# Patient Record
Sex: MALE | Race: OTHER | NOT HISPANIC OR LATINO | URBAN - METROPOLITAN AREA
[De-identification: names, ages, dates, MRNs, and addresses within clinical notes are randomized per-mention and may not be internally consistent; named-entity substitution may affect disease eponyms.]

---

## 2023-05-28 ENCOUNTER — EMERGENCY (EMERGENCY)
Facility: HOSPITAL | Age: 23
LOS: 0 days | Discharge: HOME | End: 2023-05-29
Attending: EMERGENCY MEDICINE
Payer: COMMERCIAL

## 2023-05-28 VITALS
TEMPERATURE: 97 F | DIASTOLIC BLOOD PRESSURE: 87 MMHG | SYSTOLIC BLOOD PRESSURE: 138 MMHG | HEART RATE: 86 BPM | RESPIRATION RATE: 18 BRPM | OXYGEN SATURATION: 96 % | HEIGHT: 69 IN | WEIGHT: 214.07 LBS

## 2023-05-28 DIAGNOSIS — F12.90 CANNABIS USE, UNSPECIFIED, UNCOMPLICATED: ICD-10-CM

## 2023-05-28 DIAGNOSIS — F41.9 ANXIETY DISORDER, UNSPECIFIED: ICD-10-CM

## 2023-05-28 DIAGNOSIS — R61 GENERALIZED HYPERHIDROSIS: ICD-10-CM

## 2023-05-28 DIAGNOSIS — F43.9 REACTION TO SEVERE STRESS, UNSPECIFIED: ICD-10-CM

## 2023-05-28 DIAGNOSIS — R10.9 UNSPECIFIED ABDOMINAL PAIN: ICD-10-CM

## 2023-05-28 DIAGNOSIS — N32.89 OTHER SPECIFIED DISORDERS OF BLADDER: ICD-10-CM

## 2023-05-28 DIAGNOSIS — E86.0 DEHYDRATION: ICD-10-CM

## 2023-05-28 DIAGNOSIS — R11.0 NAUSEA: ICD-10-CM

## 2023-05-28 DIAGNOSIS — R53.1 WEAKNESS: ICD-10-CM

## 2023-05-28 DIAGNOSIS — R29.700 NIHSS SCORE 0: ICD-10-CM

## 2023-05-28 DIAGNOSIS — Z20.822 CONTACT WITH AND (SUSPECTED) EXPOSURE TO COVID-19: ICD-10-CM

## 2023-05-28 LAB
ALBUMIN SERPL ELPH-MCNC: 4.4 G/DL — SIGNIFICANT CHANGE UP (ref 3.3–5)
ALP SERPL-CCNC: 58 U/L — SIGNIFICANT CHANGE UP (ref 40–120)
ALT FLD-CCNC: 37 U/L — SIGNIFICANT CHANGE UP (ref 12–78)
ANION GAP SERPL CALC-SCNC: 9 MMOL/L — SIGNIFICANT CHANGE UP (ref 5–17)
APTT BLD: 26.4 SEC — LOW (ref 27.5–35.5)
AST SERPL-CCNC: 29 U/L — SIGNIFICANT CHANGE UP (ref 15–37)
BASOPHILS # BLD AUTO: 0.02 K/UL — SIGNIFICANT CHANGE UP (ref 0–0.2)
BASOPHILS NFR BLD AUTO: 0.2 % — SIGNIFICANT CHANGE UP (ref 0–2)
BILIRUB SERPL-MCNC: 0.9 MG/DL — SIGNIFICANT CHANGE UP (ref 0.2–1.2)
BUN SERPL-MCNC: 17 MG/DL — SIGNIFICANT CHANGE UP (ref 7–23)
CALCIUM SERPL-MCNC: 9.2 MG/DL — SIGNIFICANT CHANGE UP (ref 8.5–10.1)
CHLORIDE SERPL-SCNC: 104 MMOL/L — SIGNIFICANT CHANGE UP (ref 96–108)
CK SERPL-CCNC: 620 U/L — HIGH (ref 26–308)
CK SERPL-CCNC: 692 U/L — HIGH (ref 26–308)
CO2 SERPL-SCNC: 25 MMOL/L — SIGNIFICANT CHANGE UP (ref 22–31)
CREAT SERPL-MCNC: 1.04 MG/DL — SIGNIFICANT CHANGE UP (ref 0.5–1.3)
EGFR: 105 ML/MIN/1.73M2 — SIGNIFICANT CHANGE UP
EOSINOPHIL # BLD AUTO: 0.08 K/UL — SIGNIFICANT CHANGE UP (ref 0–0.5)
EOSINOPHIL NFR BLD AUTO: 1 % — SIGNIFICANT CHANGE UP (ref 0–6)
GLUCOSE SERPL-MCNC: 99 MG/DL — SIGNIFICANT CHANGE UP (ref 70–99)
HCT VFR BLD CALC: 46.6 % — SIGNIFICANT CHANGE UP (ref 39–50)
HGB BLD-MCNC: 16 G/DL — SIGNIFICANT CHANGE UP (ref 13–17)
IMM GRANULOCYTES NFR BLD AUTO: 0.5 % — SIGNIFICANT CHANGE UP (ref 0–0.9)
INR BLD: 1.03 RATIO — SIGNIFICANT CHANGE UP (ref 0.88–1.16)
LIDOCAIN IGE QN: 70 U/L — LOW (ref 73–393)
LYMPHOCYTES # BLD AUTO: 0.42 K/UL — LOW (ref 1–3.3)
LYMPHOCYTES # BLD AUTO: 5 % — LOW (ref 13–44)
MANUAL SMEAR VERIFICATION: SIGNIFICANT CHANGE UP
MCHC RBC-ENTMCNC: 28.7 PG — SIGNIFICANT CHANGE UP (ref 27–34)
MCHC RBC-ENTMCNC: 34.3 GM/DL — SIGNIFICANT CHANGE UP (ref 32–36)
MCV RBC AUTO: 83.7 FL — SIGNIFICANT CHANGE UP (ref 80–100)
MONOCYTES # BLD AUTO: 0.36 K/UL — SIGNIFICANT CHANGE UP (ref 0–0.9)
MONOCYTES NFR BLD AUTO: 4.3 % — SIGNIFICANT CHANGE UP (ref 2–14)
NEUTROPHILS # BLD AUTO: 7.5 K/UL — HIGH (ref 1.8–7.4)
NEUTROPHILS NFR BLD AUTO: 89 % — HIGH (ref 43–77)
PLAT MORPH BLD: NORMAL — SIGNIFICANT CHANGE UP
PLATELET # BLD AUTO: 288 K/UL — SIGNIFICANT CHANGE UP (ref 150–400)
PLATELET COUNT - ESTIMATE: NORMAL — SIGNIFICANT CHANGE UP
POTASSIUM SERPL-MCNC: 4 MMOL/L — SIGNIFICANT CHANGE UP (ref 3.5–5.3)
POTASSIUM SERPL-SCNC: 4 MMOL/L — SIGNIFICANT CHANGE UP (ref 3.5–5.3)
PROT SERPL-MCNC: 8.2 GM/DL — SIGNIFICANT CHANGE UP (ref 6–8.3)
PROTHROM AB SERPL-ACNC: 12 SEC — SIGNIFICANT CHANGE UP (ref 10.5–13.4)
RBC # BLD: 5.57 M/UL — SIGNIFICANT CHANGE UP (ref 4.2–5.8)
RBC # FLD: 13.1 % — SIGNIFICANT CHANGE UP (ref 10.3–14.5)
RBC BLD AUTO: NORMAL — SIGNIFICANT CHANGE UP
SARS-COV-2 RNA SPEC QL NAA+PROBE: SIGNIFICANT CHANGE UP
SODIUM SERPL-SCNC: 138 MMOL/L — SIGNIFICANT CHANGE UP (ref 135–145)
TROPONIN I, HIGH SENSITIVITY RESULT: 24.52 NG/L — SIGNIFICANT CHANGE UP
WBC # BLD: 8.42 K/UL — SIGNIFICANT CHANGE UP (ref 3.8–10.5)
WBC # FLD AUTO: 8.42 K/UL — SIGNIFICANT CHANGE UP (ref 3.8–10.5)

## 2023-05-28 PROCEDURE — 85025 COMPLETE CBC W/AUTO DIFF WBC: CPT

## 2023-05-28 PROCEDURE — 74177 CT ABD & PELVIS W/CONTRAST: CPT | Mod: MD

## 2023-05-28 PROCEDURE — 84484 ASSAY OF TROPONIN QUANT: CPT

## 2023-05-28 PROCEDURE — 74177 CT ABD & PELVIS W/CONTRAST: CPT | Mod: 26,MD

## 2023-05-28 PROCEDURE — 70450 CT HEAD/BRAIN W/O DYE: CPT | Mod: 26,MD

## 2023-05-28 PROCEDURE — 83690 ASSAY OF LIPASE: CPT

## 2023-05-28 PROCEDURE — 80053 COMPREHEN METABOLIC PANEL: CPT

## 2023-05-28 PROCEDURE — 85610 PROTHROMBIN TIME: CPT

## 2023-05-28 PROCEDURE — 70450 CT HEAD/BRAIN W/O DYE: CPT | Mod: MD

## 2023-05-28 PROCEDURE — 93010 ELECTROCARDIOGRAM REPORT: CPT

## 2023-05-28 PROCEDURE — 82550 ASSAY OF CK (CPK): CPT

## 2023-05-28 PROCEDURE — 85730 THROMBOPLASTIN TIME PARTIAL: CPT

## 2023-05-28 PROCEDURE — 99285 EMERGENCY DEPT VISIT HI MDM: CPT | Mod: 25

## 2023-05-28 PROCEDURE — 87635 SARS-COV-2 COVID-19 AMP PRB: CPT

## 2023-05-28 PROCEDURE — 99284 EMERGENCY DEPT VISIT MOD MDM: CPT

## 2023-05-28 PROCEDURE — 93005 ELECTROCARDIOGRAM TRACING: CPT

## 2023-05-28 PROCEDURE — 36415 COLL VENOUS BLD VENIPUNCTURE: CPT

## 2023-05-28 RX ORDER — ONDANSETRON 8 MG/1
4 TABLET, FILM COATED ORAL ONCE
Refills: 0 | Status: COMPLETED | OUTPATIENT
Start: 2023-05-28 | End: 2023-05-28

## 2023-05-28 RX ORDER — SODIUM CHLORIDE 9 MG/ML
1000 INJECTION INTRAMUSCULAR; INTRAVENOUS; SUBCUTANEOUS ONCE
Refills: 0 | Status: COMPLETED | OUTPATIENT
Start: 2023-05-28 | End: 2023-05-28

## 2023-05-28 RX ADMIN — SODIUM CHLORIDE 1000 MILLILITER(S): 9 INJECTION INTRAMUSCULAR; INTRAVENOUS; SUBCUTANEOUS at 20:57

## 2023-05-28 RX ADMIN — SODIUM CHLORIDE 1000 MILLILITER(S): 9 INJECTION INTRAMUSCULAR; INTRAVENOUS; SUBCUTANEOUS at 23:22

## 2023-05-28 NOTE — ED PROVIDER NOTE - CARE PROVIDER_API CALL
Luan Alves  Pediatrics  [address] [address] [place] [id number]  Phone: [phone]  Fax: [phone]  Follow Up Time:    Luan Alves  Pediatrics  [address] [address] [place] [address]  Phone: [phone]  Fax: [phone]  Follow Up Time:     Clarence Morales  Gastroenterology  [address] [place] [address], [facility] [place] [address]  Phone: [phone]  Fax: [phone]  Follow Up Time:

## 2023-05-28 NOTE — ED PROVIDER NOTE - OBJECTIVE STATEMENT
23 y/o M w/ no pertinent PMHx BIBEMS c/o anxiety attack. pt recently traveled home from a cruise going to the [facility]. denies EtOH use today but did smoke marijuana. states that he was at a friend's house today and was tired, but could not sleep. he also endorsed abdominal pain at the time. when he was driving back home, he experienced weakness in b/l arms. pt was then diaphoretic and states that his limbs locked up with associating severe pain. pt was able to call EMS. currently c/o of nausea, abdominal pain, and dehydration. states that he is under a lot of stress. denies prior anxiety/panic attacks. mom at bedside adds that he recently burned the bottom of feet while playing basketball. no other recent trauma. denies CP. 23 y/o M w/ no pertinent PMHx BIBEMS c/o anxiety attack. pt recently traveled home from a cruise going to the [facility]. denies EtOH use today but did smoke marijuana. states that he was at a friend's house today and was tired, but could not sleep. he also endorsed abdominal pain at the time. when he was driving back home, he experienced generalized weakness. pt was then diaphoretic and states that his limbs locked up with associating severe pain. pt was able to call EMS. currently c/o of nausea, abdominal pain, and dehydration. states that he is under a lot of emotional stress. denies prior anxiety/panic attacks. mom at bedside adds that he recently burned the bottom of feet while playing basketball. No pain at soles of feet, no ankle pain, no joint pain in the ER, no neck pain or stiffness, no skin rash, no recent tick bites, no melena or hematochezia.  no other recent trauma. denies CP, sob or palpitation in ER.

## 2023-05-28 NOTE — ED ADULT NURSE NOTE - NSFALLUNIVINTERV_ED_ALL_ED
Bed/Stretcher in lowest position, wheels locked, appropriate side rails in place/Call bell, personal items and telephone in reach/Instruct patient to call for assistance before getting out of bed/chair/stretcher/Non-slip footwear applied when patient is off stretcher/[place] to call system/Physically safe environment - no spills, clutter or unnecessary equipment/Purposeful proactive rounding/Room/bathroom lighting operational, light cord in reach

## 2023-05-28 NOTE — ED PROVIDER NOTE - CARE PLAN
Principal Discharge DX:	Abdominal pain   1 Principal Discharge DX:	Abdominal pain  Goal:	dehydration, incidental findings on the CAT scan

## 2023-05-28 NOTE — ED ADULT TRIAGE NOTE - CHIEF COMPLAINT QUOTE
pt BIBA complaining of anxiety attack.  pt just recently traveled home from [place].  pt denies alcohol today but did smoke weed.  pt denies SI/HI.  unknown cause of anxiety

## 2023-05-28 NOTE — ED PROVIDER NOTE - NSFOLLOWUPINSTRUCTIONS_ED_ALL_ED_FT
Please note that I have provided you with the results of your labs and imaging. I have discussed with you all the results including all incidental findings, and that you need to see your primary care physician and a gastroenterologists as soon as possible. Please note that even though the CAT scan imaging is an ideal emergent testing that it does not always see all pathology and can not rule out subtle ulcers, masses, tumors or even small perforations at times, so if your symptoms worsen, if you can not eat or drink or if you have any fever, chills or vomiting to return to us immediately. Please avoid marijuana or stress, spicy food or other spices. Hydrate well. Please avoid gym or other exercises. Please note that your CPK (Creatinine Kinase) blood work was slightly high and we repeated it after hydration and it went down so continue to hydrate, but if you have worsening muscle pain, or if you can not eat or drink, or if any other health concerns, return to us immediately. You MUST SEE YOUR DOCTOR BY TUESDAY OR IF YOU CAN NOT TO RETURN TO US IMMEDIATELY.    For all other health concerns reutrn to us immediately.     _______________    Abdominal Pain, Adult  Pain in the abdomen (abdominal pain) can be caused by many things. Often, abdominal pain is not serious and it gets better with no treatment or by being treated at home. However, sometimes abdominal pain is serious.    Your health care provider will ask questions about your medical history and do a physical exam to try to determine the cause of your abdominal pain.    Follow these instructions at home:  Medicines    Take over-the-counter and prescription medicines only as told by your health care provider.  Do not take a laxative unless told by your health care provider.  General instructions      Watch your condition for any changes.  Drink enough fluid to keep your urine pale yellow.  Keep all follow-up visits as told by your health care provider. This is important.  Contact a health care provider if:  Your abdominal pain changes or gets worse.  You are not hungry or you lose weight without trying.  You are constipated or have diarrhea for more than 2–3 days.  You have pain when you urinate or have a bowel movement.  Your abdominal pain wakes you up at night.  Your pain gets worse with meals, after eating, or with certain foods.  You are vomiting and cannot keep anything down.  You have a fever.  You have blood in your urine.  Get help right away if:  Your pain does not go away as soon as your health care provider told you to expect.  You cannot stop vomiting.  Your pain is only in areas of the abdomen, such as the right side or the left lower portion of the abdomen. Pain on the right side could be caused by appendicitis.  You have bloody or black stools, or stools that look like tar.  You have severe pain, cramping, or bloating in your abdomen.  You have signs of dehydration, such as:  Dark urine, very little urine, or no urine.  Cracked lips.  Dry mouth.  Sunken eyes.  Sleepiness.  Weakness.  You have trouble breathing or chest pain.  Summary  Often, abdominal pain is not serious and it gets better with no treatment or by being treated at home. However, sometimes abdominal pain is serious.  Watch your condition for any changes.  Take over-the-counter and prescription medicines only as told by your health care provider.  Contact a health care provider if your abdominal pain changes or gets worse.  Get help right away if you have severe pain, cramping, or bloating in your abdomen.  This information is not intended to replace advice given to you by your health care provider. Make sure you discuss any questions you have with your health care provider.

## 2023-05-28 NOTE — ED PROVIDER NOTE - NS_ ATTENDINGSCRIBEDETAILS _ED_A_ED_FT
I Sher Jensen MD saw and examined the patient. Scribe documented for me and under my supervision. I have modified the scribe's documentation where necessary to reflect my history, physical exam and other relevant documentations pertinent to the care of the patient.

## 2023-05-28 NOTE — ED PROVIDER NOTE - DIFFERENTIAL DIAGNOSIS
Differential Diagnosis Patient with abdominal pain, anxiety, labs, imaging performed. CT shows no acute emergent findings, incidental findings discussed with patient. Multiple hour observation in the ER is unremarkable. Tolerating PO, CNs 2-12 intact. NIH=0, pain resolved, all results including slightly elevated but improving CPK provided for patient. Patient to follow up with GI and primary, strict return precautions provided. Pt and mom agreeable with plan.

## 2023-05-28 NOTE — ED PROVIDER NOTE - PATIENT PORTAL LINK FT
You can access the FollowMyHealth Patient Portal offered by [facility] by registering at the following website: [email or web address]. By joining [email or web address]’s FollowMyHealth portal, you will also be able to view your health information using other applications (apps) compatible with our system.

## 2023-05-28 NOTE — ED PROVIDER NOTE - ENMT, MLM
Airway patent, Nasal mucosa clear. Mouth with normal mucosa. Throat has no vesicles, no oropharyngeal exudates and uvula is midline. Airway patent, Nasal mucosa clear. Mouth with normal mucosa. Throat has no vesicles, no oropharyngeal exudates and uvula is midline. No epsitaxis.

## 2023-05-28 NOTE — ED PROVIDER NOTE - ENMT NEGATIVE STATEMENT, MLM
none Ears: no ear pain and no hearing problems. Nose: no nasal congestion and no nasal drainage. Mouth/Throat: no dysphagia, no hoarseness and no throat pain. Neck: no lumps, no pain, no stiffness and no swollen glands.

## 2023-05-28 NOTE — ED ADULT NURSE NOTE - OBJECTIVE STATEMENT
Pt. is a 22YOM pt BIBA complaining of abdominal pain and anxiety. Pt. endorsing nausea. pt just recently traveled home from [place] country.  pt denies alcohol today but endorses smoking marijuana.  pt denies SI/HI.

## 2023-05-28 NOTE — ED PROVIDER NOTE - PROGRESS NOTE DETAILS
Camila COLEMAN: Patient nontoxic, VSS, tolerating PO, ambulatory, CTs negative for any acute pathology. Provided patient with the results of the CT. Patient to see primary and GI. Trace pelvic fluid but no evidence of perforation as per radiology. Patient provided with results and the need for follow up. Camila COLEMAN: Patient nontoxic, VSS, tolerating PO, ambulatory, CTs negative for any acute pathology. Provided patient with the results of the CT. Patient to see primary and GI. Trace pelvic fluid but no evidence of perforation as per radiology. Patient provided with results and the need for follow up. Patient and mom wish to go home, aware that CAT scan imaging while ideal for emergency is not an ideal imaging, and are happy with care provided. Strict return precautions provided if any fever, chills, repeat or return of the abdominal pain provided.

## 2023-05-28 NOTE — ED PROVIDER NOTE - CLINICAL SUMMARY MEDICAL DECISION MAKING FREE TEXT BOX
pt with abd pain, nausea, lightheadedness. plan: labs, CT of abdomen, hydration, and reassess. pt with abd pain, nausea, lightheadedness. plan: labs, CT of abdomen, hydration, and reassess.    Patient with abdominal pain, anxiety, labs, imaging performed. CT shows no acute emergent findings, incidental findings discussed with patient. Multiple hour observation in the ER is unremarkable. Tolerating PO, CNs 2-12 intact. NIH=0, pain resolved, all results including slightly elevated but improving CPK provided for patient. Patient to follow up with GI and primary, strict return precautions provided. Pt and mom agreeable with plan.

## 2023-05-28 NOTE — ED PROVIDER NOTE - PROVIDER TOKENS
PROVIDER:[TOKEN:[[id number]]] PROVIDER:[TOKEN:[[id number]]],PROVIDER:[TOKEN:[[id number]]]

## 2023-05-29 VITALS
TEMPERATURE: 98 F | OXYGEN SATURATION: 100 % | RESPIRATION RATE: 18 BRPM | SYSTOLIC BLOOD PRESSURE: 124 MMHG | HEART RATE: 84 BPM | DIASTOLIC BLOOD PRESSURE: 56 MMHG

## 2023-06-01 PROBLEM — Z00.00 ENCOUNTER FOR PREVENTIVE HEALTH EXAMINATION: Status: ACTIVE | Noted: 2023-06-01

## 2023-06-07 NOTE — ED PROVIDER NOTE - RESPIRATORY NEGATIVE STATEMENT, MLM
GENERAL PRE-PROCEDURE:   Procedure:  SVT ablation    Written consent obtained?: Yes    Risks and benefits: Risks, benefits and alternatives were discussed    Consent given by:  Patient  Patient states understanding of procedure being performed: Yes    Patient's understanding of procedure matches consent: Yes    Procedure consent matches procedure scheduled: Yes    Expected level of sedation:  Moderate  Appropriately NPO:  Yes  Mallampati  :  Grade 2- soft palate, base of uvula, tonsillar pillars, and portion of posterior pharyngeal wall visible  Lungs:  Lungs clear with good breath sounds bilaterally  Heart:  Normal heart sounds and rate  History & Physical reviewed:  History and physical reviewed and no updates needed  Statement of review:  I have reviewed the lab findings, diagnostic data, medications, and the plan for sedation     no chest pain, no cough, and no shortness of breath.

## 2023-08-08 ENCOUNTER — EMERGENCY (EMERGENCY)
Facility: HOSPITAL | Age: 23
LOS: 1 days | Discharge: HOME | End: 2023-08-08
Attending: EMERGENCY MEDICINE | Admitting: EMERGENCY MEDICINE
Payer: COMMERCIAL

## 2023-08-08 VITALS
WEIGHT: 209.44 LBS | OXYGEN SATURATION: 98 % | SYSTOLIC BLOOD PRESSURE: 133 MMHG | HEART RATE: 85 BPM | TEMPERATURE: 98 F | DIASTOLIC BLOOD PRESSURE: 81 MMHG | HEIGHT: 70 IN | RESPIRATION RATE: 15 BRPM

## 2023-08-08 VITALS
HEART RATE: 63 BPM | SYSTOLIC BLOOD PRESSURE: 125 MMHG | DIASTOLIC BLOOD PRESSURE: 61 MMHG | OXYGEN SATURATION: 99 % | RESPIRATION RATE: 16 BRPM | TEMPERATURE: 98 F

## 2023-08-08 LAB
ALBUMIN SERPL ELPH-MCNC: 4.3 G/DL — SIGNIFICANT CHANGE UP (ref 3.3–5)
ALP SERPL-CCNC: 46 U/L — SIGNIFICANT CHANGE UP (ref 30–120)
ALT FLD-CCNC: 43 U/L DA — SIGNIFICANT CHANGE UP (ref 10–60)
ANION GAP SERPL CALC-SCNC: 8 MMOL/L — SIGNIFICANT CHANGE UP (ref 5–17)
AST SERPL-CCNC: 54 U/L — HIGH (ref 10–40)
BASOPHILS # BLD AUTO: 0.03 K/UL — SIGNIFICANT CHANGE UP (ref 0–0.2)
BASOPHILS NFR BLD AUTO: 0.5 % — SIGNIFICANT CHANGE UP (ref 0–2)
BILIRUB SERPL-MCNC: 0.4 MG/DL — SIGNIFICANT CHANGE UP (ref 0.2–1.2)
BUN SERPL-MCNC: 15 MG/DL — SIGNIFICANT CHANGE UP (ref 7–23)
CALCIUM SERPL-MCNC: 9.5 MG/DL — SIGNIFICANT CHANGE UP (ref 8.4–10.5)
CHLORIDE SERPL-SCNC: 100 MMOL/L — SIGNIFICANT CHANGE UP (ref 96–108)
CO2 SERPL-SCNC: 27 MMOL/L — SIGNIFICANT CHANGE UP (ref 22–31)
CREAT SERPL-MCNC: 1.17 MG/DL — SIGNIFICANT CHANGE UP (ref 0.5–1.3)
EGFR: 91 ML/MIN/1.73M2 — SIGNIFICANT CHANGE UP
EOSINOPHIL # BLD AUTO: 0.04 K/UL — SIGNIFICANT CHANGE UP (ref 0–0.5)
EOSINOPHIL NFR BLD AUTO: 0.6 % — SIGNIFICANT CHANGE UP (ref 0–6)
GLUCOSE SERPL-MCNC: 96 MG/DL — SIGNIFICANT CHANGE UP (ref 70–99)
HCT VFR BLD CALC: 43.1 % — SIGNIFICANT CHANGE UP (ref 39–50)
HGB BLD-MCNC: 14.5 G/DL — SIGNIFICANT CHANGE UP (ref 13–17)
IMM GRANULOCYTES NFR BLD AUTO: 0.2 % — SIGNIFICANT CHANGE UP (ref 0–0.9)
LYMPHOCYTES # BLD AUTO: 0.9 K/UL — LOW (ref 1–3.3)
LYMPHOCYTES # BLD AUTO: 14.2 % — SIGNIFICANT CHANGE UP (ref 13–44)
MAGNESIUM SERPL-MCNC: 1.9 MG/DL — SIGNIFICANT CHANGE UP (ref 1.6–2.6)
MCHC RBC-ENTMCNC: 28.4 PG — SIGNIFICANT CHANGE UP (ref 27–34)
MCHC RBC-ENTMCNC: 33.6 GM/DL — SIGNIFICANT CHANGE UP (ref 32–36)
MCV RBC AUTO: 84.3 FL — SIGNIFICANT CHANGE UP (ref 80–100)
MONOCYTES # BLD AUTO: 0.45 K/UL — SIGNIFICANT CHANGE UP (ref 0–0.9)
MONOCYTES NFR BLD AUTO: 7.1 % — SIGNIFICANT CHANGE UP (ref 2–14)
NEUTROPHILS # BLD AUTO: 4.91 K/UL — SIGNIFICANT CHANGE UP (ref 1.8–7.4)
NEUTROPHILS NFR BLD AUTO: 77.4 % — HIGH (ref 43–77)
NRBC # BLD: 0 /100 WBCS — SIGNIFICANT CHANGE UP (ref 0–0)
PLATELET # BLD AUTO: 283 K/UL — SIGNIFICANT CHANGE UP (ref 150–400)
POTASSIUM SERPL-MCNC: 3.9 MMOL/L — SIGNIFICANT CHANGE UP (ref 3.5–5.3)
POTASSIUM SERPL-SCNC: 3.9 MMOL/L — SIGNIFICANT CHANGE UP (ref 3.5–5.3)
PROT SERPL-MCNC: 7.9 G/DL — SIGNIFICANT CHANGE UP (ref 6–8.3)
RBC # BLD: 5.11 M/UL — SIGNIFICANT CHANGE UP (ref 4.2–5.8)
RBC # FLD: 13 % — SIGNIFICANT CHANGE UP (ref 10.3–14.5)
SODIUM SERPL-SCNC: 135 MMOL/L — SIGNIFICANT CHANGE UP (ref 135–145)
WBC # BLD: 6.34 K/UL — SIGNIFICANT CHANGE UP (ref 3.8–10.5)
WBC # FLD AUTO: 6.34 K/UL — SIGNIFICANT CHANGE UP (ref 3.8–10.5)

## 2023-08-08 PROCEDURE — 80053 COMPREHEN METABOLIC PANEL: CPT

## 2023-08-08 PROCEDURE — 85025 COMPLETE CBC W/AUTO DIFF WBC: CPT

## 2023-08-08 PROCEDURE — 93010 ELECTROCARDIOGRAM REPORT: CPT

## 2023-08-08 PROCEDURE — 83735 ASSAY OF MAGNESIUM: CPT

## 2023-08-08 PROCEDURE — 36415 COLL VENOUS BLD VENIPUNCTURE: CPT

## 2023-08-08 PROCEDURE — 99284 EMERGENCY DEPT VISIT MOD MDM: CPT

## 2023-08-08 PROCEDURE — 99284 EMERGENCY DEPT VISIT MOD MDM: CPT | Mod: 25

## 2023-08-08 PROCEDURE — 93005 ELECTROCARDIOGRAM TRACING: CPT

## 2023-08-08 NOTE — ED PROVIDER NOTE - DIFFERENTIAL DIAGNOSIS
Differential including but not limited to SVT rapid A-fib or other arrhythmia electrolyte abnormality dehydration anemia drug ingestion Differential Diagnosis

## 2023-08-08 NOTE — ED PROVIDER NOTE - OBJECTIVE STATEMENT
23 M c/o feeling of shakiness, palpitations that started around 1530 today when he started smoking marijuana, which he does regularly. Went to an urgent care, was referred to ED due to elevated HR ~107. Pt states he feels significantly better than when symptoms started and better than when he was at urgent care. Denies f/c, cp, sob, n/v, abd pain, leg pain/swelling, recent travel, sick contacts. Denies recent etoh use. Denies tobacco or other drug use.

## 2023-08-08 NOTE — ED PROVIDER NOTE - PATIENT PORTAL LINK FT
You can access the FollowMyHealth Patient Portal offered by [facility] by registering at the following website: [email or web address]. By joining [email or web address]’s FollowMyHealth portal, you will also be able to view your health information using other applications (apps) compatible with our system.

## 2023-08-08 NOTE — ED PROVIDER NOTE - CLINICAL SUMMARY MEDICAL DECISION MAKING FREE TEXT BOX
Patient referred by urgent care for palpitations and feeling jittery status post smoking marijuana this afternoon.  Patient relates symptoms began approximately 1330 when he start to smoke marijuana.  Patient relates symptoms lasted for approximately 45 minutes to an hour.  Patient no longer symptomatic.  Patient denies fevers chills chest pain short of breath abdominal pain calf pain edema.    Plan EKG labs IV fluid    Differential including but not limited to SVT rapid A-fib or other arrhythmia electrolyte abnormality dehydration anemia drug ingestion Patient referred by urgent care for palpitations and feeling jittery status post smoking marijuana this afternoon.  Patient relates symptoms began approximately 1530 when he start to smoke marijuana.  Patient relates symptoms lasted for approximately 45 minutes to an hour.  Patient no longer symptomatic.  Patient denies fevers chills chest pain short of breath abdominal pain calf pain edema.    Plan EKG labs IV fluid    Differential including but not limited to SVT rapid A-fib or other arrhythmia electrolyte abnormality dehydration anemia drug ingestion

## 2023-08-08 NOTE — ED PROVIDER NOTE - CROS ED CARDIOVAS ALL NEG
Hi Team,When you have a moment please help me.Patient's evaluation has been completed and is ready to be submitted for AUTH, please see \"plan\" section as needed for frequency and duration recommendations.Thanks! Signing the ANTONIO now.Merlene - - -

## 2023-08-08 NOTE — ED PROVIDER NOTE - CARE PROVIDER_API CALL
Bryant San  Cardiology  [address]  [place] [address]  Phone: [phone]  Fax: [phone]  Follow Up Time:

## 2023-08-08 NOTE — ED ADULT TRIAGE NOTE - HEIGHT IN CM
177.8 Medical Necessity Statement: Based on my medical judgement, Mohs surgery is the most appropriate treatment for this cancer compared to other treatments.

## 2023-08-08 NOTE — ED ADULT NURSE NOTE - OBJECTIVE STATEMENT
Pt came in ambulatory from an urgent care complains of headache ( resolved ) , not feeling well and tachycardia after he smoked weed today at around 3 pm. Pt also reported vomited x 1 . Pt is a regularly smoke weed . Pt reported feeling better upon arrival at the ED

## 2023-08-08 NOTE — ED ADULT NURSE REASSESSMENT NOTE - NSFALLUNIVINTERV_ED_ALL_ED
Bed/Stretcher in lowest position, wheels locked, appropriate side rails in place/Call bell, personal items and telephone in reach/Instruct patient to call for assistance before getting out of bed/chair/stretcher/Non-slip footwear applied when patient is off stretcher/[place] to call system/Physically safe environment - no spills, clutter or unnecessary equipment/Purposeful proactive rounding/Room/bathroom lighting operational, light cord in reach

## 2023-08-08 NOTE — ED PROVIDER NOTE - NSFOLLOWUPINSTRUCTIONS_ED_ALL_ED_FT
Stay hydrated.  Avoid marijuana use.  Return for worsening or concerning symptoms.        Marijuana is a mixture of the dried leaves and flowers of the hemp plant Cannabis sativa. The plant's active ingredients (cannabinoids) change the chemistry of the brain. If you smoke or eat marijuana, you will experience changes in the way you think, feel, and behave.    What is marijuana used for?  In some cases, marijuana is prescribed by a health care provider (medical marijuana) for temporary relief from a medical condition. It can have medical effects, such as:  Reduced nausea.  Increased appetite.  Reduced muscle spasm.  Pain relief.  Anxiety relief.  Many people use marijuana for recreational purposes because it helps them relax and puts them in a pleasurable mood (marijuana high).    How can marijuana use affect me?  Marijuana affects you both mentally and physically. Using marijuana can make you feel high and relaxed. It can also have negative effects, both short term and long term. When used for recreational purposes, marijuana is often used in higher doses and for longer periods. High doses of marijuana can cause unpleasant side effects. It is also possible to become addicted to this drug.    Short-term effects of marijuana use include:  Changes in mood and perception, such as an altered sense of time.  Increased heart rate.  Increased appetite.  Slowed movement, coordination, and reaction time.  Poor memory, judgment, and problem-solving ability.  Drowsiness.  Bloodshot eyes and changes in vision.  Coughing.  High doses of marijuana can cause:  Panic.  Anxiety.  Mental confusion.  Severe dizziness.  Vomiting.  Hallucinations. This means you see, hear, taste, smell, or feel things that are not real.  Coma.  What can happen if I keep using marijuana?  If you use marijuana for a long time, it can have long-term effects such as:  Higher risk of health problems, such as:  Lung and breathing problems.  Possible higher risk of heart and cardiovascular problems or testicular cancer.  Mental and physical dependence (addiction).  Slowed brain development in young people. Babies whose mothers used marijuana during pregnancy may have an increased risk of problems with brain development and behavior.  Hallucinations or temporary periods of false perceptions or beliefs (paranoia).  Worsening of mental illness or onset of new mental illness. This can include anxiety, depression, or suicidal thoughts.  Difficulty maintaining healthy relationships.  Poor memory and difficulty concentrating and learning. This can result in decreased intelligence, poor performance at school or work, and an increased risk of dropping out of school.  Higher risk of using other substances like alcohol, nicotine, and illegal drugs.  A condition called cannabinoid hyperemesis syndrome. This causes repeated episodes of intense abdominal pain, nausea, and vomiting.  Quitting marijuana after using it for a long time can cause withdrawal symptoms, such as:  Headache.  Shakiness.  Cravings for the drug.  Sweating.  Stomach pain, nausea, and vomiting.  Restlessness and trouble sleeping.  Anxiety, irritability, and anger.  Decreased appetite.  What are the benefits of not using marijuana?  Not using marijuana can keep you from becoming dependent on it. You can avoid the drug's negative effects on your quality of life. You can avoid accidents caused by the slowed reaction time and decreased thinking ability that are common with marijuana use and abuse. You can also prevent the long-term effects that this drug can cause.    What actions can I take to stop using marijuana?    If you are not physically or mentally dependent on marijuana, you should be able to stop using it on your own. Taking these actions may help:  Find healthy ways to cope with stress, such as exercise, meditation, or spending time with family and friends. Talk with your health care provider about how you feel and how to cope with stress.  Spend time with people who do not use marijuana, or make new friends who do not use marijuana.  Do something else instead of using marijuana. You can exercise, take up a hobby, or participate in activities that you can do with others.  Do not be afraid to say no if someone offers you marijuana. Speak up about why you do not want to use drugs. You can be a positive role model for others.  If you cannot stop on your own, ask your health care provider for help. Treatment for marijuana addiction is similar to treatment for other addictions. It may include:  Cognitive-behavioral therapy (psychotherapy). This may include individual or group therapy.  Joining a support group.  Treating medical, behavioral, or mental health conditions that exist along with marijuana dependency.  Where to find more information  Learn more about:  Marijuana from the [place] National [place] on Drug Abuse: www.drugabuse.gov  Medical marijuana from the National Institutes of Health: nccih.nih.gov  Treatment options from the Substance Abuse and Mental Health Services Administration: samhsa.gov  Recovery from marijuana dependency from Recovery.org: www.recovery.org  Contact a health care provider if:  You want to stop using marijuana but you cannot.  You have withdrawal symptoms when you try to stop using marijuana.  You are using marijuana every day.  You need to use increasing amounts of marijuana to get the same desired effect.  You are using marijuana along with other drugs like cocaine or alcohol.  You have anxiety or depression.  You have hallucinations or paranoia.  Marijuana use is interfering with your relationships or your ability to function normally at school or at work.  Get help right away if:  You develop severe chest pain, dizziness, or shortness of breath.  You have severe abdominal pain, nausea, and vomiting.  Summary  Using marijuana can make you feel high and relaxed, and if used properly, it can have some medical benefits. However, it can also have negative effects, both short term and long term.  High doses of marijuana can cause unpleasant side effects. These can be both physical and mental.  Marijuana has the potential to cause physical dependence and even addiction.  Long-term use may interfere with your ability to function normally at home, school, or work. It can sometimes lead to using other substances like alcohol, nicotine, and illegal drugs.  If you need help to stop using marijuana, ask your health care provider. Marijuana addiction can be treated.  This information is not intended to replace advice given to you by your health care provider. Make sure you discuss any questions you have with your health care provider.

## 2023-08-08 NOTE — ED ADULT NURSE NOTE - NSFALLUNIVINTERV_ED_ALL_ED
Bed/Stretcher in lowest position, wheels locked, appropriate side rails in place/Call bell, personal items and telephone in reach/Instruct patient to call for assistance before getting out of bed/chair/stretcher/Non-slip footwear applied when patient is off stretcher/[place] to call system/Physically safe environment - no spills, clutter or unnecessary equipment/Purposeful proactive rounding/Room/bathroom lighting operational, light cord in reach

## 2023-08-08 NOTE — ED ADULT TRIAGE NOTE - CHIEF COMPLAINT QUOTE
patient went to urgent care center for tachycardia after smoking weed, came to ED for further eval. vss

## 2023-08-09 PROBLEM — Z78.9 OTHER SPECIFIED HEALTH STATUS: Chronic | Status: ACTIVE | Noted: 2023-06-16

## 2023-09-28 ENCOUNTER — APPOINTMENT (OUTPATIENT)
Dept: ORTHOPEDIC SURGERY | Facility: CLINIC | Age: 23
End: 2023-09-28
Payer: COMMERCIAL

## 2023-09-28 VITALS
WEIGHT: 219 LBS | DIASTOLIC BLOOD PRESSURE: 72 MMHG | BODY MASS INDEX: 31.35 KG/M2 | HEART RATE: 79 BPM | SYSTOLIC BLOOD PRESSURE: 116 MMHG | HEIGHT: 70 IN

## 2023-09-28 PROCEDURE — 73130 X-RAY EXAM OF HAND: CPT | Mod: LT

## 2023-09-28 PROCEDURE — 99204 OFFICE O/P NEW MOD 45 MIN: CPT

## 2023-09-30 ENCOUNTER — OUTPATIENT (OUTPATIENT)
Dept: OUTPATIENT SERVICES | Facility: HOSPITAL | Age: 23
LOS: 1 days | End: 2023-09-30
Payer: COMMERCIAL

## 2023-09-30 ENCOUNTER — TRANSCRIPTION ENCOUNTER (OUTPATIENT)
Age: 23
End: 2023-09-30

## 2023-09-30 DIAGNOSIS — Z01.818 ENCOUNTER FOR OTHER PREPROCEDURAL EXAMINATION: ICD-10-CM

## 2023-09-30 LAB
A1C WITH ESTIMATED AVERAGE GLUCOSE RESULT: 5.4 % — SIGNIFICANT CHANGE UP (ref 4–5.6)
ANION GAP SERPL CALC-SCNC: 11 MMOL/L — SIGNIFICANT CHANGE UP (ref 5–17)
APTT BLD: 31 SEC — SIGNIFICANT CHANGE UP (ref 24.5–35.6)
BASOPHILS # BLD AUTO: 0.03 K/UL — SIGNIFICANT CHANGE UP (ref 0–0.2)
BASOPHILS NFR BLD AUTO: 0.6 % — SIGNIFICANT CHANGE UP (ref 0–2)
BUN SERPL-MCNC: 17.8 MG/DL — SIGNIFICANT CHANGE UP (ref 8–20)
CALCIUM SERPL-MCNC: 9.5 MG/DL — SIGNIFICANT CHANGE UP (ref 8.4–10.5)
CHLORIDE SERPL-SCNC: 101 MMOL/L — SIGNIFICANT CHANGE UP (ref 96–108)
CO2 SERPL-SCNC: 27 MMOL/L — SIGNIFICANT CHANGE UP (ref 22–29)
CREAT SERPL-MCNC: 1.03 MG/DL — SIGNIFICANT CHANGE UP (ref 0.5–1.3)
EGFR: 106 ML/MIN/1.73M2 — SIGNIFICANT CHANGE UP
EOSINOPHIL # BLD AUTO: 0.16 K/UL — SIGNIFICANT CHANGE UP (ref 0–0.5)
EOSINOPHIL NFR BLD AUTO: 3 % — SIGNIFICANT CHANGE UP (ref 0–6)
ESTIMATED AVERAGE GLUCOSE: 108 MG/DL — SIGNIFICANT CHANGE UP (ref 68–114)
GLUCOSE SERPL-MCNC: 75 MG/DL — SIGNIFICANT CHANGE UP (ref 70–99)
HCT VFR BLD CALC: 44.5 % — SIGNIFICANT CHANGE UP (ref 39–50)
HGB BLD-MCNC: 14.4 G/DL — SIGNIFICANT CHANGE UP (ref 13–17)
IMM GRANULOCYTES NFR BLD AUTO: 0.2 % — SIGNIFICANT CHANGE UP (ref 0–0.9)
INR BLD: 1.03 RATIO — SIGNIFICANT CHANGE UP (ref 0.85–1.18)
LYMPHOCYTES # BLD AUTO: 1.47 K/UL — SIGNIFICANT CHANGE UP (ref 1–3.3)
LYMPHOCYTES # BLD AUTO: 27.5 % — SIGNIFICANT CHANGE UP (ref 13–44)
MCHC RBC-ENTMCNC: 28.4 PG — SIGNIFICANT CHANGE UP (ref 27–34)
MCHC RBC-ENTMCNC: 32.4 GM/DL — SIGNIFICANT CHANGE UP (ref 32–36)
MCV RBC AUTO: 87.8 FL — SIGNIFICANT CHANGE UP (ref 80–100)
MONOCYTES # BLD AUTO: 0.33 K/UL — SIGNIFICANT CHANGE UP (ref 0–0.9)
MONOCYTES NFR BLD AUTO: 6.2 % — SIGNIFICANT CHANGE UP (ref 2–14)
NEUTROPHILS # BLD AUTO: 3.34 K/UL — SIGNIFICANT CHANGE UP (ref 1.8–7.4)
NEUTROPHILS NFR BLD AUTO: 62.5 % — SIGNIFICANT CHANGE UP (ref 43–77)
PLATELET # BLD AUTO: 288 K/UL — SIGNIFICANT CHANGE UP (ref 150–400)
POTASSIUM SERPL-MCNC: 4.3 MMOL/L — SIGNIFICANT CHANGE UP (ref 3.5–5.3)
POTASSIUM SERPL-SCNC: 4.3 MMOL/L — SIGNIFICANT CHANGE UP (ref 3.5–5.3)
PROTHROM AB SERPL-ACNC: 11.4 SEC — SIGNIFICANT CHANGE UP (ref 9.5–13)
RBC # BLD: 5.07 M/UL — SIGNIFICANT CHANGE UP (ref 4.2–5.8)
RBC # FLD: 12.5 % — SIGNIFICANT CHANGE UP (ref 10.3–14.5)
SODIUM SERPL-SCNC: 139 MMOL/L — SIGNIFICANT CHANGE UP (ref 135–145)
WBC # BLD: 5.34 K/UL — SIGNIFICANT CHANGE UP (ref 3.8–10.5)
WBC # FLD AUTO: 5.34 K/UL — SIGNIFICANT CHANGE UP (ref 3.8–10.5)

## 2023-09-30 PROCEDURE — 36415 COLL VENOUS BLD VENIPUNCTURE: CPT

## 2023-09-30 PROCEDURE — 85730 THROMBOPLASTIN TIME PARTIAL: CPT

## 2023-09-30 PROCEDURE — 83036 HEMOGLOBIN GLYCOSYLATED A1C: CPT

## 2023-09-30 PROCEDURE — G0463: CPT

## 2023-09-30 PROCEDURE — 85025 COMPLETE CBC W/AUTO DIFF WBC: CPT

## 2023-09-30 PROCEDURE — 80048 BASIC METABOLIC PNL TOTAL CA: CPT

## 2023-09-30 PROCEDURE — 85610 PROTHROMBIN TIME: CPT

## 2023-10-09 ENCOUNTER — APPOINTMENT (OUTPATIENT)
Dept: ORTHOPEDIC SURGERY | Facility: AMBULATORY SURGERY CENTER | Age: 23
End: 2023-10-09
Payer: COMMERCIAL

## 2023-10-09 PROCEDURE — 26746 TREAT FINGER FRACTURE EACH: CPT | Mod: F4

## 2023-10-09 PROCEDURE — 26123 RELEASE PALM CONTRACTURE: CPT | Mod: F8

## 2023-10-09 RX ORDER — TRAMADOL HYDROCHLORIDE 50 MG/1
50 TABLET, COATED ORAL
Qty: 8 | Refills: 0 | Status: ACTIVE | COMMUNITY
Start: 2023-10-09 | End: 1900-01-01

## 2023-10-17 ENCOUNTER — APPOINTMENT (OUTPATIENT)
Dept: ORTHOPEDIC SURGERY | Facility: CLINIC | Age: 23
End: 2023-10-17
Payer: COMMERCIAL

## 2023-10-17 VITALS
DIASTOLIC BLOOD PRESSURE: 74 MMHG | HEIGHT: 70 IN | WEIGHT: 216 LBS | HEART RATE: 54 BPM | BODY MASS INDEX: 30.92 KG/M2 | SYSTOLIC BLOOD PRESSURE: 129 MMHG

## 2023-10-17 PROCEDURE — 99024 POSTOP FOLLOW-UP VISIT: CPT

## 2023-11-02 ENCOUNTER — APPOINTMENT (OUTPATIENT)
Dept: ORTHOPEDIC SURGERY | Facility: CLINIC | Age: 23
End: 2023-11-02
Payer: COMMERCIAL

## 2023-11-02 VITALS
WEIGHT: 216 LBS | HEIGHT: 70 IN | HEART RATE: 90 BPM | BODY MASS INDEX: 30.92 KG/M2 | DIASTOLIC BLOOD PRESSURE: 78 MMHG | SYSTOLIC BLOOD PRESSURE: 129 MMHG

## 2023-11-02 PROCEDURE — 99024 POSTOP FOLLOW-UP VISIT: CPT

## 2023-12-08 ENCOUNTER — APPOINTMENT (OUTPATIENT)
Dept: ORTHOPEDIC SURGERY | Facility: CLINIC | Age: 23
End: 2023-12-08
Payer: COMMERCIAL

## 2023-12-08 DIAGNOSIS — M79.643 PAIN IN UNSPECIFIED HAND: ICD-10-CM

## 2023-12-08 DIAGNOSIS — S62.609D FRACTURE OF UNSPECIFIED PHALANX OF UNSPECIFIED FINGER, SUBSEQUENT ENCOUNTER FOR FRACTURE WITH ROUTINE HEALING: ICD-10-CM

## 2023-12-08 DIAGNOSIS — M79.646 PAIN IN UNSPECIFIED HAND: ICD-10-CM

## 2023-12-08 PROCEDURE — 99024 POSTOP FOLLOW-UP VISIT: CPT

## 2024-02-09 ENCOUNTER — APPOINTMENT (OUTPATIENT)
Dept: ORTHOPEDIC SURGERY | Facility: CLINIC | Age: 24
End: 2024-02-09

## 2024-10-04 NOTE — ED ADULT NURSE REASSESSMENT NOTE - SYMPTOMS
-- DO NOT REPLY / DO NOT REPLY ALL --  -- This inbox is not monitored. If this was sent to the wrong provider or department, reroute message to [employer] (FIG)oute pool. --  -- Message is from [facility] ([facility]) -    Referral Request  Name of Specialist: Mac Gutierrez MD  Provider's specialty: Nephrology    Medical condition for referral:  kidney issues/ burning sensation after urinating     Is this a NEW request?: yes      Referral ordered by: Luz Maria Thomas      Insurance type:       Payor: [employer] / Plan: [id number] / Product Type: PPO MISC    Preferred Delivery Method  Fax - number to send to: [phone] and Input in Epic    Caller Information       Contact Date/Time Type Contact Phone/Fax    10/04/2024 03:24 PM CDT Phone (Incoming) Mika Camacho (Self) [phone] (M)            Alternative phone number: None    Can a detailed message be left?  Yes - Voicemail     Patient has been advised the message will be addressed within 2-3 business days        none

## 2024-10-08 ENCOUNTER — APPOINTMENT (OUTPATIENT)
Dept: MRI IMAGING | Facility: CLINIC | Age: 24
End: 2024-10-08